# Patient Record
Sex: FEMALE | Race: WHITE | Employment: OTHER | URBAN - METROPOLITAN AREA
[De-identification: names, ages, dates, MRNs, and addresses within clinical notes are randomized per-mention and may not be internally consistent; named-entity substitution may affect disease eponyms.]

---

## 2023-05-29 PROBLEM — S27.322A CONTUSION OF BOTH LUNGS: Status: ACTIVE | Noted: 2023-05-29

## 2023-05-29 PROBLEM — J90 PLEURAL EFFUSION, RIGHT: Status: ACTIVE | Noted: 2023-05-29

## 2023-05-29 PROBLEM — Z99.11 ON MECHANICALLY ASSISTED VENTILATION: Status: ACTIVE | Noted: 2023-05-29

## 2023-05-29 PROBLEM — Z95.0 PACEMAKER: Chronic | Status: ACTIVE | Noted: 2023-05-29

## 2023-05-29 PROBLEM — D69.9 BLEEDING DIATHESIS: Chronic | Status: ACTIVE | Noted: 2023-05-29

## 2023-05-29 PROBLEM — S22.43XB: Status: ACTIVE | Noted: 2023-05-29

## 2023-05-29 PROBLEM — D69.6 THROMBOCYTOPENIA: Status: ACTIVE | Noted: 2023-05-29

## 2023-05-29 PROBLEM — R74.01 ELEVATED AST (SGOT): Status: ACTIVE | Noted: 2023-05-29

## 2023-05-29 PROBLEM — T14.8XXA BRUISING: Status: ACTIVE | Noted: 2023-05-29

## 2023-05-29 PROBLEM — R79.89 ELEVATED LACTIC ACID LEVEL: Status: ACTIVE | Noted: 2023-05-29

## 2023-05-29 PROBLEM — R74.01 ELEVATED ALT MEASUREMENT: Status: ACTIVE | Noted: 2023-05-29

## 2023-05-29 PROBLEM — I25.10 CAD (CORONARY ARTERY DISEASE): Chronic | Status: ACTIVE | Noted: 2023-05-29

## 2023-05-29 PROBLEM — J94.2 HEMOTHORAX, RIGHT: Status: ACTIVE | Noted: 2023-05-29

## 2023-05-29 PROBLEM — R56.9 SEIZURE: Status: ACTIVE | Noted: 2023-05-29
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
## 2023-05-29 PROBLEM — I63.9 CVA (CEREBRAL VASCULAR ACCIDENT): Chronic | Status: ACTIVE | Noted: 2023-05-29

## 2023-05-29 PROBLEM — S06.5XAA SDH (SUBDURAL HEMATOMA): Status: ACTIVE | Noted: 2023-05-29

## 2023-05-29 PROBLEM — I10 HTN (HYPERTENSION): Chronic | Status: ACTIVE | Noted: 2023-05-29

## 2023-05-29 PROBLEM — I48.91 ATRIAL FIBRILLATION: Chronic | Status: ACTIVE | Noted: 2023-05-29

## 2023-05-31 PROBLEM — I10 HTN (HYPERTENSION): Status: ACTIVE | Noted: 2023-05-29

## 2023-05-31 PROBLEM — E83.39 HYPOPHOSPHATEMIA: Status: ACTIVE | Noted: 2023-05-31

## 2023-05-31 PROBLEM — R79.89 ELEVATED LACTIC ACID LEVEL: Status: RESOLVED | Noted: 2023-05-29 | Resolved: 2023-05-31

## 2023-05-31 PROBLEM — D53.9 MACROCYTIC ANEMIA: Status: ACTIVE | Noted: 2023-05-31

## 2023-05-31 PROBLEM — Z99.11 ON MECHANICALLY ASSISTED VENTILATION: Status: RESOLVED | Noted: 2023-05-29 | Resolved: 2023-05-31

## 2023-05-31 PROBLEM — D69.9 BLEEDING DIATHESIS: Status: ACTIVE | Noted: 2023-05-29

## 2023-05-31 PROBLEM — I63.9 CVA (CEREBRAL VASCULAR ACCIDENT): Status: ACTIVE | Noted: 2023-05-29

## 2023-05-31 PROBLEM — I48.91 ATRIAL FIBRILLATION: Status: ACTIVE | Noted: 2023-05-29

## 2023-05-31 PROBLEM — I25.10 CAD (CORONARY ARTERY DISEASE): Status: ACTIVE | Noted: 2023-05-29

## 2023-05-31 PROBLEM — S22.43XA CLOSED FRACTURE OF MULTIPLE RIBS OF BOTH SIDES: Status: ACTIVE | Noted: 2023-05-29

## 2023-05-31 PROBLEM — Z95.0 PACEMAKER: Status: ACTIVE | Noted: 2023-05-29

## 2023-06-01 PROBLEM — J94.2 HEMOTHORAX, RIGHT: Status: RESOLVED | Noted: 2023-05-29 | Resolved: 2023-06-01

## 2023-06-01 PROBLEM — E83.42 HYPOMAGNESEMIA: Status: ACTIVE | Noted: 2023-06-01

## 2023-06-01 PROBLEM — D69.6 THROMBOCYTOPENIA: Status: RESOLVED | Noted: 2023-05-29 | Resolved: 2023-06-01

## 2023-06-01 PROBLEM — R56.9 SEIZURE: Status: RESOLVED | Noted: 2023-05-29 | Resolved: 2023-06-01

## 2023-06-01 PROBLEM — I62.00 SUBDURAL HEMORRHAGE: Status: ACTIVE | Noted: 2023-05-29

## 2023-06-09 PROBLEM — E80.6 HYPERBILIRUBINEMIA: Status: ACTIVE | Noted: 2023-06-09

## 2023-06-09 PROBLEM — S06.5XAA SDH (SUBDURAL HEMATOMA): Status: ACTIVE | Noted: 2023-06-09

## 2023-06-09 PROBLEM — E87.1 HYPONATREMIA: Status: ACTIVE | Noted: 2023-06-09

## 2023-06-09 PROBLEM — R79.1 PROLONGED PT (PROTHROMBIN TIME): Status: ACTIVE | Noted: 2023-06-09

## 2023-06-14 PROBLEM — R13.12 OROPHARYNGEAL DYSPHAGIA: Status: ACTIVE | Noted: 2023-06-14

## 2023-06-15 PROBLEM — G93.89 MIDLINE SHIFT OF BRAIN DUE TO HEMATOMA: Status: ACTIVE | Noted: 2023-06-15

## 2023-06-15 PROBLEM — S06.2X0S MIDLINE SHIFT OF BRAIN DUE TO HEMATOMA: Status: ACTIVE | Noted: 2023-06-15

## 2023-06-15 PROBLEM — S06.5X0A TRAUMATIC SUBDURAL HEMATOMA WITHOUT LOSS OF CONSCIOUSNESS: Status: ACTIVE | Noted: 2023-06-09

## 2023-06-18 PROBLEM — E87.6 HYPOKALEMIA: Status: ACTIVE | Noted: 2023-06-18

## 2023-07-08 PROBLEM — N17.9 AKI (ACUTE KIDNEY INJURY): Status: ACTIVE | Noted: 2023-07-08

## 2023-07-08 PROBLEM — Z91.148 NONCOMPLIANCE WITH MEDICATION REGIMEN: Status: ACTIVE | Noted: 2023-07-08

## 2023-07-08 PROBLEM — R94.31 T WAVE INVERSION IN EKG: Status: ACTIVE | Noted: 2023-07-08

## 2023-07-08 PROBLEM — Z60.9 SOCIAL PROBLEM: Status: ACTIVE | Noted: 2023-07-08

## 2023-07-08 PROBLEM — F10.10 ALCOHOL ABUSE: Status: ACTIVE | Noted: 2023-07-08

## 2023-07-08 PROBLEM — K76.6 PORTAL HYPERTENSION: Status: RESOLVED | Noted: 2023-07-08 | Resolved: 2023-07-08

## 2023-07-08 PROBLEM — S09.90XA HEAD TRAUMA: Status: ACTIVE | Noted: 2023-07-08

## 2023-07-08 PROBLEM — K76.6 PORTAL HYPERTENSION: Status: ACTIVE | Noted: 2023-07-08

## 2023-07-14 ENCOUNTER — PATIENT OUTREACH (OUTPATIENT)
Dept: ADMINISTRATIVE | Facility: CLINIC | Age: 78
End: 2023-07-14

## 2023-07-14 NOTE — PROGRESS NOTES
C3 nurse spoke with Kelle Solitario and daughter Rose for a TCC post hospital discharge follow up call. The patient has a scheduled HOSFU appointment with Juan Winston on 7/19/23 @ 8320.

## 2023-09-04 PROBLEM — I63.9 CVA (CEREBRAL VASCULAR ACCIDENT): Status: RESOLVED | Noted: 2023-05-29 | Resolved: 2023-09-04

## 2023-09-21 PROBLEM — G93.40 ENCEPHALOPATHY: Status: ACTIVE | Noted: 2023-09-21

## 2023-09-23 PROBLEM — F10.90 ALCOHOL USE DISORDER: Status: ACTIVE | Noted: 2023-09-23

## 2023-09-23 PROBLEM — F41.9 ANXIETY: Status: ACTIVE | Noted: 2023-09-23

## 2023-09-23 PROBLEM — Z00.8 MEDICAL CLEARANCE FOR PSYCHIATRIC ADMISSION: Status: ACTIVE | Noted: 2023-09-23

## 2023-10-09 PROBLEM — N17.9 AKI (ACUTE KIDNEY INJURY): Status: RESOLVED | Noted: 2023-07-08 | Resolved: 2023-10-09

## 2023-12-25 PROBLEM — Z00.8 MEDICAL CLEARANCE FOR PSYCHIATRIC ADMISSION: Status: RESOLVED | Noted: 2023-09-23 | Resolved: 2023-12-25
